# Patient Record
Sex: MALE | Race: WHITE | NOT HISPANIC OR LATINO | Employment: FULL TIME | URBAN - METROPOLITAN AREA
[De-identification: names, ages, dates, MRNs, and addresses within clinical notes are randomized per-mention and may not be internally consistent; named-entity substitution may affect disease eponyms.]

---

## 2022-03-23 ENCOUNTER — OFFICE VISIT (OUTPATIENT)
Dept: SLEEP MEDICINE | Facility: HOSPITAL | Age: 37
End: 2022-03-23

## 2022-03-23 VITALS
HEIGHT: 71 IN | WEIGHT: 201 LBS | BODY MASS INDEX: 28.14 KG/M2 | HEART RATE: 60 BPM | DIASTOLIC BLOOD PRESSURE: 67 MMHG | SYSTOLIC BLOOD PRESSURE: 122 MMHG | OXYGEN SATURATION: 98 %

## 2022-03-23 DIAGNOSIS — G47.33 OBSTRUCTIVE SLEEP APNEA, ADULT: ICD-10-CM

## 2022-03-23 DIAGNOSIS — E66.3 OVERWEIGHT: ICD-10-CM

## 2022-03-23 DIAGNOSIS — R06.83 SNORING: Primary | ICD-10-CM

## 2022-03-23 PROCEDURE — 99202 OFFICE O/P NEW SF 15 MIN: CPT | Performed by: INTERNAL MEDICINE

## 2022-03-23 RX ORDER — ALPRAZOLAM 0.5 MG/1
TABLET ORAL
COMMUNITY

## 2022-03-23 RX ORDER — LEVOTHYROXINE SODIUM 0.05 MG/1
TABLET ORAL
COMMUNITY

## 2022-03-23 RX ORDER — ROSUVASTATIN CALCIUM 40 MG/1
TABLET, COATED ORAL
COMMUNITY

## 2022-03-23 RX ORDER — FENOFIBRATE 160 MG/1
TABLET ORAL
COMMUNITY

## 2022-04-12 NOTE — PROGRESS NOTES
"Chief Complaint  Snoring and nonrestorative sleep    Subjective         Steven Bearden presents to [facility] for the evaluation of snoring nonrestorative sleep.  His primary care physician is Dr. Gtz.  He is seen in person in the sleep clinic.  History of Present Illness  Patient states he has been told he snores loudly.  He has his wife makes him to sleep in a separate room.  He occasionally awakens gasping for breath and choking.  He been noted to have apneas.  He has snoring noted for 5 to 6 years.  And also apneas along.  He is not rested on arising in the morning.  He denies having a morning headache.    He will awaken with a dry mouth.  He denies ever breaking his nose but sometimes has trouble breathing through his nose.  He will fall asleep but sitting quietly at times.  He denies kicking or jerking his legs.  He denies having chronic pain that keeps him awake.    He goes to bed about 11 PM.  He will fall asleep in 5 to 10 minutes.  He awakens once during the night.  He gets hours of sleep but is not rested.  He denies any history of hypertension, diabetes, coronary artery disease.    Past medical history:    Allergies: Penicillin    Habits: Smoking: Without    Ethanol: He has 5 or 6 drinks 4 times per week    Caffeine: He has 1/2 cup of coffee per day    Medical illnesses: He denies any significant    Medications: Levothyroxine and rosuvastatin    Surgeries: He had wisdom teeth extraction    Family history: He denies any significant family history    Review of systems: All systems were reviewed and reportedly negative.    [place] score 4/24  Objective   Vital Signs:   /67 (BP Location: Left arm, Patient Position: Sitting, Cuff Size: Adult)   Pulse 60   Ht 180.3 cm (71\")   Wt 91.2 kg (201 lb)   SpO2 98%   BMI 28.03 kg/m²            Physical Exam patient appears to be awake and alert.  He does not appear to be in acute respiratory distress.    He is " normocephalic.  He has Mallampati class II anatomy.    Lungs are clear.    Cardiac normal S1-S2.    Extremities showed no edema           Assessment and Plan    Diagnoses and all orders for this visit:    1. Snoring (Primary)  -     Home Sleep Study; Future    2. Obstructive sleep apnea, adult  -     Home Sleep Study; Future    3. Overweight    Patient has a history of snoring and observed apneas.  He gives an excellent story for obstructive sleep apnea.  We will plan to proceed to home sleep testing.  We have discussed potential therapies including CPAP, weight control, oral appliance, and surgery.  We have discussed the long-term consequences of untreated obstructive sleep apnea.  These include hypertension, diabetes, heart disease, stroke, dementia.  He is encouraged to achieve ideal body weight.  Encouraged to avoid alcohol and sedatives close to bedtime.  He is encouraged to practice lateral position sleep.  We will see him back after his study  I spent 25 minutes caring for Steven on this date of service. This time includes time spent by me in the following activities:obtaining and/or reviewing a separately obtained history, performing a medically appropriate examination and/or evaluation , counseling and educating the patient/family/caregiver, ordering medications, tests, or procedures and documenting information in the medical record  Follow Up   Return for Follow up after study, Next scheduled follow-up.  Patient was given instructions and counseling regarding his condition or for health maintenance advice. Please see specific information pulled into the AVS if appropriate.   Samir Byrnes MD [facility]  Sleep Medicine  Pulmonary and Critical Care Medicine

## 2022-06-02 ENCOUNTER — HOSPITAL ENCOUNTER (OUTPATIENT)
Dept: SLEEP MEDICINE | Facility: HOSPITAL | Age: 37
Discharge: HOME | End: 2022-06-02
Admitting: INTERNAL MEDICINE

## 2022-06-02 VITALS — BODY MASS INDEX: 28.14 KG/M2 | WEIGHT: 201 LBS | HEIGHT: 71 IN

## 2022-06-02 DIAGNOSIS — R06.83 SNORING: ICD-10-CM

## 2022-06-02 DIAGNOSIS — G47.33 OBSTRUCTIVE SLEEP APNEA, ADULT: ICD-10-CM

## 2022-06-02 PROCEDURE — 95800 SLP STDY UNATTENDED: CPT

## 2022-06-02 PROCEDURE — 95800 SLP STDY UNATTENDED: CPT | Performed by: INTERNAL MEDICINE

## 2022-06-09 ENCOUNTER — TELEMEDICINE (OUTPATIENT)
Dept: SLEEP MEDICINE | Facility: HOSPITAL | Age: 37
End: 2022-06-09

## 2022-06-09 DIAGNOSIS — E66.3 OVERWEIGHT: ICD-10-CM

## 2022-06-09 DIAGNOSIS — Z72.821 INADEQUATE SLEEP HYGIENE: ICD-10-CM

## 2022-06-09 DIAGNOSIS — R06.83 SNORING: Primary | ICD-10-CM

## 2022-06-09 PROCEDURE — 99213 OFFICE O/P EST LOW 20 MIN: CPT | Performed by: INTERNAL MEDICINE

## 2022-06-27 NOTE — PROGRESS NOTES
"Chief Complaint  Snoring and nonrestorative sleep    Subjective        Steven Bearden presents to [facility] for the evaluation of snoring and nonrestorative sleep.  His primary care physician is Dr. Gtz. You have chosen to receive care through a telehealth visit.  Do you consent to use a video/audio connection for your medical care today? Yes  History of Present Illness  Patient was last seen in clinic March 23.  He had snoring nonrestorative sleep and was overweight.  He says he has been sleeping about the same.  He still awakens tired.  He had a sleep study on June 6.  He returns for results.  He says he still snoring occasionally.  He is going to bed about 11 PM.  He will fall asleep quickly.  He awakens 2-3 times during the night.  He thinks he gets about 7 hours of sleep.    He did not think that he slept well the study and had trouble falling asleep.      Review of systems: All systems were reviewed and reportedly negative    [place] score is 6/24    .Objective   Vital Signs:  There were no vitals taken for this visit.  Estimated body mass index is 28.03 kg/m² as calculated from the following:    Height as of 6/2/22: 180.3 cm (71\").    Weight as of 6/2/22: 91.2 kg (201 lb).          Physical Exam patient appears to be awake and alert.  He is not appear to be in acute respiratory distress.    His stated weight is 193 pounds.  His height 71 inches  Result Review :    Patient had home sleep testing on June 6.  And overall an AHI of 2.3.  His RDI was significantly higher at 10.2.  He had occasional snoring.  His index in the supine position was 3.3.     Assessment and Plan   Diagnoses and all orders for this visit:    1. Snoring (Primary)    2. Inadequate sleep hygiene    3. Overweight    Patient does seem to have snoring but did not show significant sleep disordered breathing on his home test.  His RDI was slightly Higher and would consider further evaluation with " polysomnogram.  The patient however does not wish to pursue further evaluation at this time.  He is encouraged to achieve ideal body weight.  He is encouraged avoid alcohol and sedatives close to bedtime.  He is encouraged to practice lateral position sleep.    He is also to practice excellent sleep hygiene.  He is to get a regular rise time and a regular bedtime.  He is to get light exposure early in the day to help with his circadian rhythm.  He is to cut off caffeine by mid afternoon.  He is to develop a nighttime ritual and get off all electronic screens of at least an hour before the time he wishes to be asleep.    We will plan to see him back roughly 6 months.  He is to contact us earlier if symptoms worsen.       I spent 25 minutes caring for Steven on this date of service. This time includes time spent by me in the following activities:reviewing tests, obtaining and/or reviewing a separately obtained history, performing a medically appropriate examination and/or evaluation , counseling and educating the patient/family/caregiver, ordering medications, tests, or procedures and documenting information in the medical record  Follow Up   Return in about 6 months (around 12/9/2022) for Next scheduled follow-up, Video visit.  Patient was given instructions and counseling regarding his condition or for health maintenance advice. Please see specific information pulled into the AVS if appropriate.   Samir Byrnes MD [facility]  Sleep Medicine  Pulmonary and Critical Care Medicine